# Patient Record
Sex: FEMALE | Race: BLACK OR AFRICAN AMERICAN | NOT HISPANIC OR LATINO | URBAN - METROPOLITAN AREA
[De-identification: names, ages, dates, MRNs, and addresses within clinical notes are randomized per-mention and may not be internally consistent; named-entity substitution may affect disease eponyms.]

---

## 2020-10-09 ENCOUNTER — LAB OUTSIDE AN ENCOUNTER (OUTPATIENT)
Dept: URBAN - METROPOLITAN AREA CLINIC 96 | Facility: CLINIC | Age: 50
End: 2020-10-09

## 2020-10-12 ENCOUNTER — OFFICE VISIT (OUTPATIENT)
Dept: URBAN - METROPOLITAN AREA CLINIC 96 | Facility: CLINIC | Age: 50
End: 2020-10-12
Payer: COMMERCIAL

## 2020-10-12 ENCOUNTER — WEB ENCOUNTER (OUTPATIENT)
Dept: URBAN - METROPOLITAN AREA CLINIC 96 | Facility: CLINIC | Age: 50
End: 2020-10-12

## 2020-10-12 DIAGNOSIS — K21.9 GASTROESOPHAGEAL REFLUX DISEASE, UNSPECIFIED WHETHER ESOPHAGITIS PRESENT: ICD-10-CM

## 2020-10-12 DIAGNOSIS — Z12.11 COLON CANCER SCREENING: ICD-10-CM

## 2020-10-12 PROCEDURE — G9903 PT SCRN TBCO ID AS NON USER: HCPCS | Performed by: INTERNAL MEDICINE

## 2020-10-12 PROCEDURE — G8417 CALC BMI ABV UP PARAM F/U: HCPCS | Performed by: INTERNAL MEDICINE

## 2020-10-12 PROCEDURE — 99203 OFFICE O/P NEW LOW 30 MIN: CPT | Performed by: INTERNAL MEDICINE

## 2020-10-12 PROCEDURE — G8482 FLU IMMUNIZE ORDER/ADMIN: HCPCS | Performed by: INTERNAL MEDICINE

## 2020-10-12 PROCEDURE — G8427 DOCREV CUR MEDS BY ELIG CLIN: HCPCS | Performed by: INTERNAL MEDICINE

## 2020-10-12 RX ORDER — SODIUM, POTASSIUM,MAG SULFATES 17.5-3.13G
354ML SOLUTION, RECONSTITUTED, ORAL ORAL
Qty: 354 MILLILITER | Refills: 0 | OUTPATIENT
Start: 2020-10-09 | End: 2020-10-10

## 2020-10-12 RX ORDER — ONDANSETRON HYDROCHLORIDE 4 MG/1
1 TABLET AS NEEDED TABLET, FILM COATED ORAL ONCE A DAY
Qty: 2 | Refills: 0 | OUTPATIENT
Start: 2020-10-09

## 2020-10-12 NOTE — HPI-TODAY'S VISIT:
51 yo female referred by Dr Weber for colon cancer screening. Pt started omeprazole 20mg recently for GERD. She started to have throat burning recently, In the past she felt a choking sensation in the past when she would sleep but this also is fairly new. The otc omeprazole is helping, her symptoms are gone. No dysphagia, no n/v or abdominal pain. Pt admits she has to work on her weight and not eating late at night. Pt sees Dr Vizcarra for her thyroid. No dysphagia, no n/v. Pt denies any issues with her bowels. 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 Pt has 4 kids.

## 2020-10-14 ENCOUNTER — TELEPHONE ENCOUNTER (OUTPATIENT)
Dept: URBAN - METROPOLITAN AREA CLINIC 96 | Facility: CLINIC | Age: 50
End: 2020-10-14

## 2020-10-14 ENCOUNTER — TELEPHONE ENCOUNTER (OUTPATIENT)
Dept: URBAN - METROPOLITAN AREA CLINIC 92 | Facility: CLINIC | Age: 50
End: 2020-10-14

## 2020-10-14 ENCOUNTER — LAB OUTSIDE AN ENCOUNTER (OUTPATIENT)
Dept: URBAN - METROPOLITAN AREA CLINIC 92 | Facility: CLINIC | Age: 50
End: 2020-10-14

## 2020-11-05 ENCOUNTER — OFFICE VISIT (OUTPATIENT)
Dept: URBAN - METROPOLITAN AREA SURGERY CENTER 16 | Facility: SURGERY CENTER | Age: 50
End: 2020-11-05

## 2020-12-08 ENCOUNTER — OFFICE VISIT (OUTPATIENT)
Dept: URBAN - METROPOLITAN AREA SURGERY CENTER 16 | Facility: SURGERY CENTER | Age: 50
End: 2020-12-08
Payer: COMMERCIAL

## 2020-12-08 ENCOUNTER — CLAIMS CREATED FROM THE CLAIM WINDOW (OUTPATIENT)
Dept: URBAN - METROPOLITAN AREA CLINIC 4 | Facility: CLINIC | Age: 50
End: 2020-12-08
Payer: COMMERCIAL

## 2020-12-08 DIAGNOSIS — K31.7 POLYP OF STOMACH AND DUODENUM: ICD-10-CM

## 2020-12-08 DIAGNOSIS — D12.0 ADENOMA OF CECUM: ICD-10-CM

## 2020-12-08 DIAGNOSIS — Z12.11 COLON CANCER SCREENING: ICD-10-CM

## 2020-12-08 DIAGNOSIS — K21.00 GASTRO-ESOPHAGEAL REFLUX DISEASE WITH ESOPHAGITIS, WITHOUT BLEEDING: ICD-10-CM

## 2020-12-08 DIAGNOSIS — K29.60 OTHER GASTRITIS WITHOUT BLEEDING: ICD-10-CM

## 2020-12-08 DIAGNOSIS — D12.2 BENIGN NEOPLASM OF ASCENDING COLON: ICD-10-CM

## 2020-12-08 DIAGNOSIS — D12.0 BENIGN NEOPLASM OF CECUM: ICD-10-CM

## 2020-12-08 DIAGNOSIS — D12.2 ADENOMA OF ASCENDING COLON: ICD-10-CM

## 2020-12-08 DIAGNOSIS — K21.9 ACID REFLUX: ICD-10-CM

## 2020-12-08 DIAGNOSIS — K31.89 OTHER DISEASES OF STOMACH AND DUODENUM: ICD-10-CM

## 2020-12-08 DIAGNOSIS — K31.89 ACQUIRED DEFORMITY OF DUODENUM: ICD-10-CM

## 2020-12-08 PROCEDURE — G8907 PT DOC NO EVENTS ON DISCHARG: HCPCS | Performed by: INTERNAL MEDICINE

## 2020-12-08 PROCEDURE — 88305 TISSUE EXAM BY PATHOLOGIST: CPT | Performed by: PATHOLOGY

## 2020-12-08 PROCEDURE — 43239 EGD BIOPSY SINGLE/MULTIPLE: CPT | Performed by: INTERNAL MEDICINE

## 2020-12-08 PROCEDURE — 88312 SPECIAL STAINS GROUP 1: CPT | Performed by: PATHOLOGY

## 2020-12-08 PROCEDURE — G9933 CANC DETECTD DURING COL SCRN: HCPCS | Performed by: INTERNAL MEDICINE

## 2020-12-08 PROCEDURE — 45385 COLONOSCOPY W/LESION REMOVAL: CPT | Performed by: INTERNAL MEDICINE

## 2020-12-08 RX ORDER — ONDANSETRON HYDROCHLORIDE 4 MG/1
1 TABLET AS NEEDED TABLET, FILM COATED ORAL ONCE A DAY
Qty: 2 | Refills: 0 | Status: ACTIVE | COMMUNITY
Start: 2020-10-09

## 2021-02-23 ENCOUNTER — OFFICE VISIT (OUTPATIENT)
Dept: URBAN - METROPOLITAN AREA CLINIC 92 | Facility: CLINIC | Age: 51
End: 2021-02-23

## 2021-03-26 ENCOUNTER — LAB OUTSIDE AN ENCOUNTER (OUTPATIENT)
Dept: URBAN - METROPOLITAN AREA CLINIC 124 | Facility: CLINIC | Age: 51
End: 2021-03-26

## 2021-03-29 ENCOUNTER — OFFICE VISIT (OUTPATIENT)
Dept: URBAN - METROPOLITAN AREA CLINIC 124 | Facility: CLINIC | Age: 51
End: 2021-03-29

## 2021-03-29 VITALS — HEIGHT: 66 IN

## 2021-03-29 RX ORDER — ONDANSETRON HYDROCHLORIDE 4 MG/1
1 TABLET AS NEEDED TABLET, FILM COATED ORAL ONCE A DAY
Qty: 2 | Refills: 0 | COMMUNITY
Start: 2020-10-09

## 2021-03-29 NOTE — HPI-TODAY'S VISIT:
51 yo female referred by Dr Weber for f/u after her egd/colonoscopy . Pt c/o a choking sensation in the past when she would sleep but this also is fairly new. The otc omeprazole was helping, her symptoms were gone. No dysphagia, no n/v or abdominal pain. Pt admitted she had to work on her weight and not eating late at night. Pt sees Dr Vizcarra for her thyroid. No dysphagia, no n/v.Pt denied any issues with her bowels. 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 Pt has 4 kids. Pt had egd and colonoscopy on 12/8/20. colon with 9 pollyps removed and egd with large hiatal hernia and erythema of stomach. Biopsies showed reactive gastropathy but no H pylori, reflux changes of esophagus, no EoE and polyps were all tubular adenomas so she is due for f/u exam in 12/2023.

## 2021-11-08 ENCOUNTER — LAB OUTSIDE AN ENCOUNTER (OUTPATIENT)
Dept: URBAN - METROPOLITAN AREA CLINIC 92 | Facility: CLINIC | Age: 51
End: 2021-11-08

## 2021-11-09 ENCOUNTER — OFFICE VISIT (OUTPATIENT)
Dept: URBAN - METROPOLITAN AREA CLINIC 92 | Facility: CLINIC | Age: 51
End: 2021-11-09
Payer: COMMERCIAL

## 2021-11-09 DIAGNOSIS — Z86.010 PERSONAL HISTORY OF COLONIC POLYPS: ICD-10-CM

## 2021-11-09 DIAGNOSIS — K31.7 POLYP OF STOMACH AND DUODENUM: ICD-10-CM

## 2021-11-09 DIAGNOSIS — K29.60 OTHER GASTRITIS WITHOUT BLEEDING: ICD-10-CM

## 2021-11-09 DIAGNOSIS — K21.00 GASTRO-ESOPHAGEAL REFLUX DISEASE WITH ESOPHAGITIS, WITHOUT BLEEDING: ICD-10-CM

## 2021-11-09 DIAGNOSIS — E61.1 IRON DEFICIENCY: ICD-10-CM

## 2021-11-09 DIAGNOSIS — K31.89 OTHER DISEASES OF STOMACH AND DUODENUM: ICD-10-CM

## 2021-11-09 PROBLEM — 92411005 BENIGN NEOPLASM OF STOMACH: Status: ACTIVE | Noted: 2021-03-26

## 2021-11-09 PROBLEM — 4556007 GASTRITIS: Status: ACTIVE | Noted: 2021-03-26

## 2021-11-09 PROBLEM — 428283002: Status: ACTIVE | Noted: 2021-03-26

## 2021-11-09 PROCEDURE — 99204 OFFICE O/P NEW MOD 45 MIN: CPT | Performed by: INTERNAL MEDICINE

## 2021-11-09 NOTE — HPI-TODAY'S VISIT:
52 yo female referred by Dr Weber for low iron and a copy of this note will be sent to the ref provider. Pt as seen for f/u after her egd/colonoscopy in 10/2020. Pt c/o a choking sensation in the past when she would sleep but this also was fairly new. The otc omeprazole was helping, her symptoms were gone. No dysphagia, no n/v or abdominal pain. Pt admitted she had to work on her weight and not eating late at night. Pt sees Dr Vizcarra for her thyroid. No dysphagia, no n/v.Pt denied any issues with her bowels. 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 Pt has 4 kids. Pt had egd and colonoscopy on 12/8/20. colon with 9 polyps removed and egd with large hiatal hernia and erythema of stomach. Biopsies showed reactive gastropathy but no H pylori, reflux changes of esophagus, no EoE and polyps were all tubular adenomas so she so I had rec a for f/u exam in 12/2023. Pt now comes in with low iron. Pt denies any symptoms, no melena or hematochezia. Pt is on omeprazole she is good with reflux. Pt not sure if she has anemia, no more periods, has IUD. Ferritin normal, iron sat is low at 5%, iron is 22, cbc with Hb slighlty low at 11 and HCT 36 and MCV is normal at 84. Platelets 469. Pt takes aleve and goodys for pain prn. Pt will see Dr Sonja Hardy whom she sees every 6 months for 2 yrs- she was initially ref to w/u myeloma- so far w/u neg.

## 2021-11-10 ENCOUNTER — OFFICE VISIT (OUTPATIENT)
Dept: URBAN - METROPOLITAN AREA SURGERY CENTER 18 | Facility: SURGERY CENTER | Age: 51
End: 2021-11-10
Payer: COMMERCIAL

## 2021-11-10 DIAGNOSIS — K22.89 OTHER SPECIFIED DISEASE OF ESOPHAGUS: ICD-10-CM

## 2021-11-10 DIAGNOSIS — K29.30 CHRONIC SUPERFICIAL GASTRITIS: ICD-10-CM

## 2021-11-10 PROCEDURE — G8907 PT DOC NO EVENTS ON DISCHARG: HCPCS | Performed by: INTERNAL MEDICINE

## 2021-11-10 PROCEDURE — 43239 EGD BIOPSY SINGLE/MULTIPLE: CPT | Performed by: INTERNAL MEDICINE

## 2021-12-03 ENCOUNTER — TELEPHONE ENCOUNTER (OUTPATIENT)
Dept: URBAN - METROPOLITAN AREA CLINIC 23 | Facility: CLINIC | Age: 51
End: 2021-12-03

## 2023-01-01 ENCOUNTER — OUT OF OFFICE VISIT (OUTPATIENT)
Dept: URBAN - METROPOLITAN AREA SURGERY CENTER 16 | Facility: SURGERY CENTER | Age: 53
End: 2023-01-01

## 2023-09-22 ENCOUNTER — OFFICE VISIT (OUTPATIENT)
Dept: URBAN - METROPOLITAN AREA CLINIC 92 | Facility: CLINIC | Age: 53
End: 2023-09-22
Payer: COMMERCIAL

## 2023-09-22 VITALS
HEIGHT: 66 IN | BODY MASS INDEX: 29.99 KG/M2 | HEART RATE: 80 BPM | DIASTOLIC BLOOD PRESSURE: 83 MMHG | WEIGHT: 186.6 LBS | TEMPERATURE: 97 F | SYSTOLIC BLOOD PRESSURE: 116 MMHG

## 2023-09-22 DIAGNOSIS — K31.7 POLYP OF STOMACH AND DUODENUM: ICD-10-CM

## 2023-09-22 DIAGNOSIS — Z86.010 PERSONAL HISTORY OF COLONIC POLYPS: ICD-10-CM

## 2023-09-22 DIAGNOSIS — K29.60 OTHER GASTRITIS WITHOUT BLEEDING: ICD-10-CM

## 2023-09-22 DIAGNOSIS — K31.89 OTHER DISEASES OF STOMACH AND DUODENUM: ICD-10-CM

## 2023-09-22 DIAGNOSIS — E61.1 IRON DEFICIENCY: ICD-10-CM

## 2023-09-22 DIAGNOSIS — K21.00 GASTRO-ESOPHAGEAL REFLUX DISEASE WITH ESOPHAGITIS, WITHOUT BLEEDING: ICD-10-CM

## 2023-09-22 PROCEDURE — 99213 OFFICE O/P EST LOW 20 MIN: CPT

## 2023-09-22 RX ORDER — SODIUM, POTASSIUM,MAG SULFATES 17.5-3.13G
177ML SOLUTION, RECONSTITUTED, ORAL ORAL
Qty: 1 KIT | Refills: 0 | OUTPATIENT
Start: 2023-09-22 | End: 2023-09-23

## 2023-09-22 RX ORDER — ONDANSETRON HYDROCHLORIDE 4 MG/1
2 TABLET TABLET, FILM COATED ORAL
Qty: 2 | Refills: 0 | OUTPATIENT
Start: 2023-09-22

## 2023-09-22 NOTE — HPI-TODAY'S VISIT:
Patient is a 53 year old female who presents for a surveillance colonoscopy      Patient previously seen by Dr. Brice - Egd/colonoscopy in 10/2020. Pt c/o a choking sensation in the past when she would sleep but this also was fairly new. The otc omeprazole was helping, her symptoms were gone. No dysphagia, no n/v or abdominal pain. Pt admitted she had to work on her weight and not eating late at night. Pt sees Dr Vizcarra for her thyroid. No dysphagia, no n/v. Pt denied any issues with her bowels. 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 Pt has 4 kids. Pt had egd and colonoscopy on 12/8/20. colon with 9 polyps removed and egd with large hiatal hernia and erythema of stomach. Biopsies showed reactive gastropathy but no H pylori, reflux changes of esophagus, no EoE and polyps were all tubular adenomas so she so I had rec a for f/u exam in 12/2023. Pt was then seen for low iron. Pt not sure if she was anemic, no more periods, has IUD. Ferritin normal, iron sat is low at 5%, iron is 22, cbc with Hb slighlty low at 11 and HCT 36 and MCV is normal at 84. Platelets 469. Pt takes aleve and goodys for pain prn. Pt will see Dr Sonja Hardy whom she sees every 6 months for 2 yrs- she was initially ref to w/u myeloma- so far w/u neg.     EGD on 11/10/21 with Dr. Brice revealed LA Grade A reflux esophagitis, large HH with some erythema in hernia sac but but no erosions or ulcers, a single diminutive polyp in hernia sac, gastritis of antrum with a single erosion, normal gastric body and duodenum, bx show mild chronic inactive gastritis, neg. for H. pylori or IM     Today, patient notes she had a gastric sleeve and hiatal hernia repair earlier this year. Patient is feeling great and is happy with her weight loss. Denies reflux, nausea, vomiting, or dysphagia. Denies abdominal pain, constipation, diarrhea, hematochezia. No new medications. Denies NSAID use. Father passed last year from colon cancer at age 78.   Has not taken BP medication in two months. Has her physcial exam scheduled for next month. Denies heart, lung, or kidney issues. No pacemaker, No home O2, No blood thinner.

## 2023-10-05 ENCOUNTER — OUT OF OFFICE VISIT (OUTPATIENT)
Dept: URBAN - METROPOLITAN AREA SURGERY CENTER 16 | Facility: SURGERY CENTER | Age: 53
End: 2023-10-05
Payer: COMMERCIAL

## 2023-10-05 ENCOUNTER — CLAIMS CREATED FROM THE CLAIM WINDOW (OUTPATIENT)
Dept: URBAN - METROPOLITAN AREA CLINIC 4 | Facility: CLINIC | Age: 53
End: 2023-10-05
Payer: COMMERCIAL

## 2023-10-05 VITALS — TEMPERATURE: 97.7 F | HEIGHT: 66 IN | WEIGHT: 186 LBS | BODY MASS INDEX: 29.89 KG/M2

## 2023-10-05 DIAGNOSIS — D12.2 ADENOMA OF ASCENDING COLON: ICD-10-CM

## 2023-10-05 DIAGNOSIS — K64.8 EXTERNAL HEMORRHOIDS: ICD-10-CM

## 2023-10-05 DIAGNOSIS — Z86.010 ADENOMAS PERSONAL HISTORY OF COLONIC POLYPS: ICD-10-CM

## 2023-10-05 DIAGNOSIS — Z80.0 BROTHER AT YOUNG AGE FAMILY HISTORY OF COLON CANCER: ICD-10-CM

## 2023-10-05 DIAGNOSIS — D12.2 BENIGN NEOPLASM OF ASCENDING COLON: ICD-10-CM

## 2023-10-05 PROCEDURE — 00811 ANES LWR INTST NDSC NOS: CPT | Performed by: NURSE ANESTHETIST, CERTIFIED REGISTERED

## 2023-10-05 PROCEDURE — 45385 COLONOSCOPY W/LESION REMOVAL: CPT | Performed by: INTERNAL MEDICINE

## 2023-10-05 PROCEDURE — 45380 COLONOSCOPY AND BIOPSY: CPT | Performed by: INTERNAL MEDICINE

## 2023-10-05 PROCEDURE — 88305 TISSUE EXAM BY PATHOLOGIST: CPT | Performed by: PATHOLOGY

## 2023-10-05 PROCEDURE — G8907 PT DOC NO EVENTS ON DISCHARG: HCPCS | Performed by: INTERNAL MEDICINE

## 2023-10-05 PROCEDURE — 00811 ANES LWR INTST NDSC NOS: CPT | Performed by: ANESTHESIOLOGY

## 2023-10-05 RX ORDER — ONDANSETRON HYDROCHLORIDE 4 MG/1
2 TABLET TABLET, FILM COATED ORAL
Qty: 2 | Refills: 0 | Status: ACTIVE | COMMUNITY
Start: 2023-09-22

## 2023-10-27 ENCOUNTER — OFFICE VISIT (OUTPATIENT)
Dept: URBAN - METROPOLITAN AREA CLINIC 92 | Facility: CLINIC | Age: 53
End: 2023-10-27

## 2023-11-15 ENCOUNTER — OFFICE VISIT (OUTPATIENT)
Dept: URBAN - METROPOLITAN AREA CLINIC 92 | Facility: CLINIC | Age: 53
End: 2023-11-15

## 2023-11-28 ENCOUNTER — OFFICE VISIT (OUTPATIENT)
Dept: URBAN - METROPOLITAN AREA CLINIC 92 | Facility: CLINIC | Age: 53
End: 2023-11-28
Payer: COMMERCIAL

## 2023-11-28 ENCOUNTER — DASHBOARD ENCOUNTERS (OUTPATIENT)
Age: 53
End: 2023-11-28

## 2023-11-28 VITALS
HEART RATE: 86 BPM | DIASTOLIC BLOOD PRESSURE: 80 MMHG | WEIGHT: 175 LBS | HEIGHT: 66 IN | BODY MASS INDEX: 28.12 KG/M2 | TEMPERATURE: 97 F | SYSTOLIC BLOOD PRESSURE: 113 MMHG

## 2023-11-28 DIAGNOSIS — E61.1 IRON DEFICIENCY: ICD-10-CM

## 2023-11-28 DIAGNOSIS — Z86.010 PERSONAL HISTORY OF COLONIC POLYPS: ICD-10-CM

## 2023-11-28 DIAGNOSIS — K31.89 OTHER DISEASES OF STOMACH AND DUODENUM: ICD-10-CM

## 2023-11-28 DIAGNOSIS — K21.00 GASTRO-ESOPHAGEAL REFLUX DISEASE WITH ESOPHAGITIS, WITHOUT BLEEDING: ICD-10-CM

## 2023-11-28 DIAGNOSIS — K31.7 POLYP OF STOMACH AND DUODENUM: ICD-10-CM

## 2023-11-28 DIAGNOSIS — K29.60 OTHER GASTRITIS WITHOUT BLEEDING: ICD-10-CM

## 2023-11-28 PROCEDURE — 99212 OFFICE O/P EST SF 10 MIN: CPT

## 2023-11-28 RX ORDER — ONDANSETRON HYDROCHLORIDE 4 MG/1
2 TABLET TABLET, FILM COATED ORAL
Qty: 2 | Refills: 0 | Status: ACTIVE | COMMUNITY
Start: 2023-09-22

## 2023-11-28 NOTE — HPI-TODAY'S VISIT:
Patient is a 53 year old female who presents in follow up from her colonoscopy.       Patient previously seen by Dr. Brice - Egd/colonoscopy in 10/2020. Pt c/o a choking sensation in the past when she would sleep but this also was fairly new. The otc omeprazole was helping, her symptoms were gone. No dysphagia, no n/v or abdominal pain. Pt admitted she had to work on her weight and not eating late at night. Pt sees Dr Vizcarra for her thyroid. No dysphagia, no n/v. Pt denied any issues with her bowels. 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 Pt has 4 kids. Pt had egd and colonoscopy on 12/8/20. colon with 9 polyps removed and egd with large hiatal hernia and erythema of stomach. Biopsies showed reactive gastropathy but no H pylori, reflux changes of esophagus, no EoE and polyps were all tubular adenomas so she so I had rec a for f/u exam in 12/2023. Pt was then seen for low iron. Pt not sure if she was anemic, no more periods, has IUD. Ferritin normal, iron sat is low at 5%, iron is 22, cbc with Hb slighlty low at 11 and HCT 36 and MCV is normal at 84. Platelets 469. Pt takes aleve and goodys for pain prn. Pt will see Dr Sonja Hardy whom she sees every 6 months for 2 yrs- she was initially ref to w/u myeloma- so far w/u neg.     EGD on 11/10/21 with Dr. Brice revealed LA Grade A reflux esophagitis, large HH with some erythema in hernia sac but but no erosions or ulcers, a single diminutive polyp in hernia sac, gastritis of antrum with a single erosion, normal gastric body and duodenum, bx show mild chronic inactive gastritis, neg. for H. pylori or IM Colonoscopy on 10/5/23 with Dr. Brice revealed an inadequate bowel prep, small IH, two 4 to 6mm, TA polyps in ascending colon, somewhat redundan tright colon, 1 yr repeat  Patient had a gastric sleeve and hiatal hernia repair earlier this year. Patient is feeling great and is happy with her weight loss. Denies reflux, nausea, vomiting, or dysphagia. Denies abdominal pain, constipation, diarrhea, hematochezia. No new medications. Denies NSAID use. Father passed last year from colon cancer at age 78.   Has not taken BP medication in two months. Had her physcial exam recently and was normal. Denies heart, lung, or kidney issues. No pacemaker, No home O2, No blood thinner.

## 2024-11-14 ENCOUNTER — OFFICE VISIT (OUTPATIENT)
Dept: URBAN - METROPOLITAN AREA CLINIC 92 | Facility: CLINIC | Age: 54
End: 2024-11-14
Payer: COMMERCIAL

## 2024-11-14 ENCOUNTER — LAB OUTSIDE AN ENCOUNTER (OUTPATIENT)
Dept: URBAN - METROPOLITAN AREA CLINIC 92 | Facility: CLINIC | Age: 54
End: 2024-11-14

## 2024-11-14 VITALS
DIASTOLIC BLOOD PRESSURE: 75 MMHG | TEMPERATURE: 97 F | HEART RATE: 86 BPM | WEIGHT: 157 LBS | HEIGHT: 66 IN | SYSTOLIC BLOOD PRESSURE: 113 MMHG | BODY MASS INDEX: 25.23 KG/M2

## 2024-11-14 DIAGNOSIS — Z80.0 FAMILY HISTORY OF COLON CANCER IN FATHER: ICD-10-CM

## 2024-11-14 DIAGNOSIS — K59.04 CHRONIC IDIOPATHIC CONSTIPATION: ICD-10-CM

## 2024-11-14 DIAGNOSIS — Z90.3 H/O GASTRIC SLEEVE: ICD-10-CM

## 2024-11-14 DIAGNOSIS — Z86.0101 PERSONAL HISTORY OF ADENOMATOUS AND SERRATED COLON POLYPS: ICD-10-CM

## 2024-11-14 PROBLEM — 329281000119107: Status: ACTIVE | Noted: 2024-11-14

## 2024-11-14 PROBLEM — 82934008: Status: ACTIVE | Noted: 2024-11-14

## 2024-11-14 PROCEDURE — 99204 OFFICE O/P NEW MOD 45 MIN: CPT | Performed by: INTERNAL MEDICINE

## 2024-11-14 RX ORDER — POLYETHYLENE GLYCOL 3350, SODIUM SULFATE ANHYDROUS, SODIUM BICARBONATE, SODIUM CHLORIDE, POTASSIUM CHLORIDE 236; 22.74; 6.74; 5.86; 2.97 G/4L; G/4L; G/4L; G/4L; G/4L
AS DIRECTED POWDER, FOR SOLUTION ORAL
Qty: 4000 ML | Refills: 0 | OUTPATIENT
Start: 2024-11-14 | End: 2024-11-15

## 2024-11-14 RX ORDER — ONDANSETRON HYDROCHLORIDE 4 MG/1
2 TABLETS TABLET, FILM COATED ORAL
Qty: 2 TABLETS | Refills: 0 | OUTPATIENT
Start: 2024-11-14

## 2024-11-14 NOTE — HPI-TODAY'S VISIT:
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  Pt had egd and colonoscopy on 20. colon with 9 polyps removed and egd with large hiatal hernia and erythema of stomach. Biopsies showed reactive gastropathy but no H pylori, reflux changes of esophagus, no EoE and polyps were all tubular adenomas so she so I had rec a for f/u exam in 2023.     EGD on 11/10/21  revealed LA Grade A reflux esophagitis, large HH with some erythema in hernia sac but but no erosions or ulcers, a single diminutive polyp in hernia sac, gastritis of antrum with a single erosion, normal gastric body and duodenum, bx show mild chronic inactive gastritis, neg. for H. pylori or IM Colonoscopy on 10/5/23 -revealed internal hemorrhoids, 2 ascending colon polyps that removed and a small amount of semiliquid semisolid stool throughout the colon.  This was lavaged but some of the residue could not be removed especially in the right colon.  Because of this I recommend repeat exam in 1 year with a constipation prep.  Pathology revealed tubular adenomas of the polyps removed.  Dad had colon cancer. Pt had a sleeve surgery and hiatal hernia repair at same time in 2023.  No more reflux at all- no meds.  Pt takes miralax daily and goes 3x a day. Pt denies heart or lung disease. Her brother  of an MI. Pt works at [employer]-works nights. Has 4 kids. Pt lost more than 100lbs since sleeve surgery.

## 2025-02-25 ENCOUNTER — OFFICE VISIT (OUTPATIENT)
Dept: URBAN - METROPOLITAN AREA SURGERY CENTER 16 | Facility: SURGERY CENTER | Age: 55
End: 2025-02-25

## 2025-03-26 ENCOUNTER — OFFICE VISIT (OUTPATIENT)
Dept: URBAN - METROPOLITAN AREA TELEHEALTH 2 | Facility: TELEHEALTH | Age: 55
End: 2025-03-26
Payer: COMMERCIAL

## 2025-03-26 ENCOUNTER — TELEPHONE ENCOUNTER (OUTPATIENT)
Dept: URBAN - METROPOLITAN AREA CLINIC 92 | Facility: CLINIC | Age: 55
End: 2025-03-26

## 2025-03-26 DIAGNOSIS — Z90.3 H/O GASTRIC SLEEVE: ICD-10-CM

## 2025-03-26 DIAGNOSIS — Z86.0101 PERSONAL HISTORY OF ADENOMATOUS AND SERRATED COLON POLYPS: ICD-10-CM

## 2025-03-26 DIAGNOSIS — K59.04 CHRONIC IDIOPATHIC CONSTIPATION: ICD-10-CM

## 2025-03-26 DIAGNOSIS — Z80.0 FAMILY HISTORY OF COLON CANCER IN FATHER: ICD-10-CM

## 2025-03-26 PROCEDURE — 99213 OFFICE O/P EST LOW 20 MIN: CPT

## 2025-03-26 RX ORDER — ONDANSETRON HYDROCHLORIDE 4 MG/1
2 TABLETS TABLET, FILM COATED ORAL
Qty: 2 TABLETS | Refills: 0 | Status: ACTIVE | COMMUNITY
Start: 2024-11-14

## 2025-03-26 RX ORDER — LINACLOTIDE 145 UG/1
1 CAPSULE AT LEAST 30 MINUTES BEFORE THE FIRST MEAL OF THE DAY ON AN EMPTY STOMACH CAPSULE, GELATIN COATED ORAL ONCE A DAY
Qty: 90 | Refills: 3 | OUTPATIENT
Start: 2025-03-26 | End: 2026-03-21

## 2025-03-26 NOTE — HPI-TODAY'S VISIT:
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 Previously seen by Dr. Brice.   Pt had egd and colonoscopy on 20. colon with 9 polyps removed and egd with large hiatal hernia and erythema of stomach. Biopsies showed reactive gastropathy but no H pylori, reflux changes of esophagus, no EoE and polyps were all tubular adenomas so she so I had rec a for f/u exam in 2023.      EGD on 11/10/21  revealed LA Grade A reflux esophagitis, large HH with some erythema in hernia sac but but no erosions or ulcers, a single diminutive polyp in hernia sac, gastritis of antrum with a single erosion, normal gastric body and duodenum, bx show mild chronic inactive gastritis, neg. for H. pylori or IM Colonoscopy on 10/5/23 -revealed internal hemorrhoids, 2 ascending colon polyps that removed and a small amount of semiliquid semisolid stool throughout the colon.  This was lavaged but some of the residue could not be removed especially in the right colon.  Because of this I recommend repeat exam in 1 year with a constipation prep.  Pathology revealed tubular adenomas of the polyps removed.  Colonoscopy 25 with Dr. Brice revealed a fair-good prep, hemorrhoids, redundant colon, no specimens collected, 5 yr repeat. Dad had colon cancer.  Pt had a sleeve surgery and hiatal hernia repair at same time in 2023.  No more reflux at all - no meds.  Pt takes miralax daily and goes 3x a day. Pt denies heart or lung disease. Her brother  of an MI. Pt works at [employer]-works nights. Has 4 kids. Pt lost more than 100lbs since sleeve surgery.  Today, via telehealth, patient notes she has a BM every 3-4 days. When she takes MiraLax she ends up wiping excessively. Denies abdominal pain, hematochezia, melena, or constitutional symptoms. Denies upper GI. Patient notes she drinks plenty of water and exercises.

## 2025-05-09 ENCOUNTER — TELEPHONE ENCOUNTER (OUTPATIENT)
Dept: URBAN - METROPOLITAN AREA CLINIC 92 | Facility: CLINIC | Age: 55
End: 2025-05-09

## 2025-05-09 RX ORDER — LINACLOTIDE 145 UG/1
1 CAPSULE AT LEAST 30 MINUTES BEFORE THE FIRST MEAL OF THE DAY ON AN EMPTY STOMACH CAPSULE, GELATIN COATED ORAL ONCE A DAY
Qty: 90 | Refills: 3
Start: 2025-03-26 | End: 2026-05-04

## 2025-05-13 ENCOUNTER — OFFICE VISIT (OUTPATIENT)
Dept: URBAN - METROPOLITAN AREA CLINIC 92 | Facility: CLINIC | Age: 55
End: 2025-05-13

## 2025-05-13 RX ORDER — LINACLOTIDE 145 UG/1
1 CAPSULE AT LEAST 30 MINUTES BEFORE THE FIRST MEAL OF THE DAY ON AN EMPTY STOMACH CAPSULE, GELATIN COATED ORAL ONCE A DAY
Qty: 90 | Refills: 3 | COMMUNITY
Start: 2025-03-26 | End: 2026-03-21

## 2025-05-13 RX ORDER — ONDANSETRON HYDROCHLORIDE 4 MG/1
2 TABLETS TABLET, FILM COATED ORAL
Qty: 2 TABLETS | Refills: 0 | COMMUNITY
Start: 2024-11-14

## 2025-05-13 NOTE — HPI-TODAY'S VISIT:
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 Previously seen by Dr. Brice.   Pt had egd and colonoscopy on 20. colon with 9 polyps removed and egd with large hiatal hernia and erythema of stomach. Biopsies showed reactive gastropathy but no H pylori, reflux changes of esophagus, no EoE and polyps were all tubular adenomas so she so I had rec a for f/u exam in 2023.      EGD on 11/10/21  revealed LA Grade A reflux esophagitis, large HH with some erythema in hernia sac but but no erosions or ulcers, a single diminutive polyp in hernia sac, gastritis of antrum with a single erosion, normal gastric body and duodenum, bx show mild chronic inactive gastritis, neg. for H. pylori or IM Colonoscopy on 10/5/23 -revealed internal hemorrhoids, 2 ascending colon polyps that removed and a small amount of semiliquid semisolid stool throughout the colon.  This was lavaged but some of the residue could not be removed especially in the right colon.  Because of this I recommend repeat exam in 1 year with a constipation prep.  Pathology revealed tubular adenomas of the polyps removed.  Colonoscopy 25 with Dr. Brice revealed a fair-good prep, hemorrhoids, redundant colon, no specimens collected, 5 yr repeat. Dad had colon cancer.  Pt had a sleeve surgery and hiatal hernia repair at same time in 2023.  No more reflux at all - no meds.  Pt takes miralax daily and goes 3x a day. Pt denies heart or lung disease. Her brother  of an MI. Pt works at [employer]-works nights. Has 4 kids. Pt lost more than 100lbs since sleeve surgery.  3/26/25, via telehealth, patient notes she has a BM every 3-4 days. When she takes MiraLax she ends up wiping excessively. Denies abdominal pain, hematochezia, melena, or constitutional symptoms. Denies upper GI. Patient notes she drinks plenty of water and exercises.